# Patient Record
Sex: MALE | Race: WHITE | NOT HISPANIC OR LATINO | Employment: OTHER | RURAL
[De-identification: names, ages, dates, MRNs, and addresses within clinical notes are randomized per-mention and may not be internally consistent; named-entity substitution may affect disease eponyms.]

---

## 2023-06-23 DIAGNOSIS — M25.512 LEFT SHOULDER PAIN: ICD-10-CM

## 2023-06-23 DIAGNOSIS — M25.511 RIGHT SHOULDER PAIN: Primary | ICD-10-CM

## 2023-06-28 ENCOUNTER — CLINICAL SUPPORT (OUTPATIENT)
Dept: REHABILITATION | Facility: HOSPITAL | Age: 73
End: 2023-06-28
Payer: MEDICARE

## 2023-06-28 DIAGNOSIS — M25.512 CHRONIC LEFT SHOULDER PAIN: ICD-10-CM

## 2023-06-28 DIAGNOSIS — M25.512 LEFT SHOULDER PAIN: ICD-10-CM

## 2023-06-28 DIAGNOSIS — M25.511 RIGHT SHOULDER PAIN: Primary | ICD-10-CM

## 2023-06-28 DIAGNOSIS — G89.29 CHRONIC LEFT SHOULDER PAIN: ICD-10-CM

## 2023-06-28 DIAGNOSIS — M25.511 ACUTE PAIN OF RIGHT SHOULDER: ICD-10-CM

## 2023-06-28 PROCEDURE — 97162 PT EVAL MOD COMPLEX 30 MIN: CPT

## 2023-06-28 NOTE — PLAN OF CARE
[facility]   Physical Therapy Initial Evaluation      Name: Lm Gautam  Clinic Number: [id number]    Therapy Diagnosis:   Encounter Diagnoses   Name Primary?    Right shoulder pain Yes    Left shoulder pain         Physician: Kirill Zuniga MD    Physician Orders: PT Eval and Treat PROM AND AAROM  Medical Diagnosis from Referral: M25.511 (ICD-10-CM) - Right shoulder pain  M25.512 (ICD-10-CM) - Left shoulder pain    Evaluation Date: 6/28/2023  Authorization Period Expiration: 6/22/24  Plan of Care Expiration: 8/4/23  Progress Note Due: 10TH visit  Visit # / Visits authorized: 1/ 15   FOTO: 44/100    Precautions: Standard     Time In: 855  Time Out: 925  Total Appointment Time (timed & untimed codes): 30 minutes    Subjective     Date of onset: 6/12/23    History of current condition - Frederic reports:  he is s/p right shoulder orthopedic rotator cuff surgery on 6/12/23 and referred to therapy for post op rehab and rehab of left shoulder as well.    Falls: none    Imaging: none:     Prior Therapy: no  Social History:  lives with their spouse  Occupation: retired  Prior Level of Function: independent  Current Level of Function: independent    Pain:  Current 3/10, worst 4/10, best 0/10   Location: right shoulder    Description: Aching and Dull  Aggravating Factors: Laying  Easing Factors: pain medication    Patients goals: to rehabilitate my shoulder and return to activities on my farm     Medical History:   No past medical history on file. HTN, heart stent.    Surgical History:   Lm Gautam  has no past surgical history on file. Orthoscopic surgery of knee    Medications:   Lm currently has no medications in their medication list.    Allergies:   Review of patient's allergies indicates:  Not on File     Objective      Posture: rounded shoulders Yes, forward head Yes, increased kyphotic curve No, decreased lordotic curve No      Range of motion  Motion Right  Left    Shoulder  flexion 85 WITHIN FUNCTIONAL LIMITS   Shoulder extension 45 WITHIN FUNCTIONAL LIMITS   Shoulder abduction 64 WITHIN FUNCTIONAL LIMITS   Shoulder internal rotation 35 WITHIN FUNCTIONAL LIMITS   Shoulder external rotation 25 WITHIN FUNCTIONAL LIMITS   Elbow flexion WITHIN FUNCTIONAL LIMITS WITHIN FUNCTIONAL LIMITS   Elbow extension WITHIN FUNCTIONAL LIMITS WITHIN FUNCTIONAL LIMITS   Wrist flexion WITHIN FUNCTIONAL LIMITS WITHIN FUNCTIONAL LIMITS   Wrist extension WITHIN FUNCTIONAL LIMITS WITHIN FUNCTIONAL LIMITS   Ulnar deviation WITHIN FUNCTIONAL LIMITS WITHIN FUNCTIONAL LIMITS   Radial deviation WITHIN FUNCTIONAL LIMITS WITHIN FUNCTIONAL LIMITS       MANUAL MUSCLE TEST  Muscle Right  Left    Shoulder flexion MMT strength: 2+/5 MMT strength: 5/5   Shoulder extension MMT strength: 3-/5 MMT strength: 5/5   Shoulder abduction MMT strength: 2+/5 MMT strength: 5/5   Shoulder internal rotation MMT strength: 3-/5 MMT strength: 5/5   Shoulder external rotation MMT strength: 2+/5 MMT strength: 5/5   Elbow flexion MMT strength: 3-/5 MMT strength: 5/5   Elbow extension MMT strength: 3-/5 MMT strength: 5/5   Wrist flexion MMT strength: 5/5 MMT strength: 5/5   Wrist extension MMT strength: 5/5 MMT strength: 5/5   Ulnar deviation MMT strength: 5/5 MMT strength: 5/5   Radial deviation MMT strength: 5/5 MMT strength: 5/5     Functional ability:  Dressing: AMB PT LEVEL OF ASSISTANCE: independent  Driving: AMB PT LEVEL OF ASSISTANCE: independent  Overhead activity: AMB PT LEVEL OF ASSISTANCE: independent  Work/hobbies: AMB PT LEVEL OF ASSISTANCE: independent      Clinical test: left shoulder  Apprehension test: negative  Neer's test: negative  Scour test: negative  O'wilver's test: negative  Drop arm test: negative  Empty can test: negative  Hawkin's darian test: negative    Limitation/Restriction for FOTO Shoulder Survey    Therapist reviewed FOTO scores for Lm MINOR Gautam on 6/28/2023.   FOTO documents entered into [employer] - see  Media section.    Limitation Score: 44               Patient Education and Home Exercises     Education provided:   - plan of care discussed with patient.      Assessment     Lm is a 72 y.o. male referred to outpatient Physical Therapy with a medical diagnosis of M25.511 (ICD-10-CM) - Right shoulder pain  M25.512 (ICD-10-CM) - Left shoulder pain. Patient presents with pain of right shoulder, decreased ROM and decreased muscle strength.    Patient prognosis is Good.   Patient will benefit from skilled outpatient Physical Therapy to address the deficits stated above and in the chart below, provide patient /family education, and to maximize patientt's level of independence.     Plan of care discussed with patient: Yes  Patient's spiritual, cultural and educational needs considered and patient is agreeable to the plan of care and goals as stated below:     Anticipated Barriers for therapy: none    Medical Necessity is demonstrated by the following  History  Co-morbidities and personal factors that may impact the plan of care [] LOW: no personal factors / co-morbidities  [x] MODERATE: 1-2 personal factors / co-morbidities  [] HIGH: 3+ personal factors / co-morbidities    Moderate / High Support Documentation:   Co-morbidities affecting plan of care: HTN, heart stent.    Personal Factors:   no deficits     Examination  Body Structures and Functions, activity limitations and participation restrictions that may impact the plan of care [] LOW: addressing 1-2 elements  [x] MODERATE: 3+ elements  [] HIGH: 4+ elements (please support below)    Moderate / High Support Documentation: bilateral shoulder ROM, strength, symmetry, posture     Clinical Presentation [x] LOW: stable  [] MODERATE: Evolving  [] HIGH: Unstable     Decision Making/ Complexity Score: moderate       Goals: patient in agreement with the following goals:  Short Term Goals: 3 weeks   1. Patient will be independent with home exercise program in 1 week.  2.  Patient will report decreased right shoulder pain to 3/10 to improve quality of life.  3. Patient will increase right shoulder AROM grossly by 30 degrees to improve functional mobility.    Long Term Goals: 5 weeks   1. Patient will increase right shoulder strength to 3-/5 to improve functional mobility.  2. Patient will increase right shoulder flexion and abduction to 110 degrees.  3. Patient will increase right shoulder ER to 45 degrees and IR to 60 degrees to improve functional mobility  4. Patient will increase FOTO Discharge score to 71 to improve functional outcomes.    Plan     Plan of care Certification: 6/28/2023 to 8/4/23.    Outpatient Physical Therapy 3 times weekly for 5 weeks to include the following interventions: Electrical Stimulation IFC, Manual Therapy, Moist Heat/ Ice, Patient Education, Therapeutic Exercise, and Ultrasound.     Rickey Vicente, PT       Physician Signature:________________________________________________      Date:____________________________________________________________

## 2023-06-30 ENCOUNTER — CLINICAL SUPPORT (OUTPATIENT)
Dept: REHABILITATION | Facility: HOSPITAL | Age: 73
End: 2023-06-30
Payer: MEDICARE

## 2023-06-30 DIAGNOSIS — M25.512 CHRONIC LEFT SHOULDER PAIN: ICD-10-CM

## 2023-06-30 DIAGNOSIS — G89.29 CHRONIC LEFT SHOULDER PAIN: ICD-10-CM

## 2023-06-30 DIAGNOSIS — M25.511 ACUTE PAIN OF RIGHT SHOULDER: Primary | ICD-10-CM

## 2023-06-30 PROCEDURE — 97110 THERAPEUTIC EXERCISES: CPT

## 2023-06-30 PROCEDURE — 97140 MANUAL THERAPY 1/> REGIONS: CPT

## 2023-06-30 NOTE — PROGRESS NOTES
[facility]   Physical Therapy Treatment Note      Name: Lm Gautam  Clinic Number: [id number]    Therapy Diagnosis:  s/p right shoulder Rotator cuff repair  Encounter Diagnoses   Name Primary?    Acute pain of right shoulder Yes    Chronic left shoulder pain      Physician: Kirill Zuniga MD    Visit Date: 6/30/2023    [copy and paste header from rito here]    PTA Visit #: 0/5     Time In: 800  Time Out: 845  Total Billable Time: 45 minutes    Subjective     Pt reports: no complaints.    Response to previous treatment: today is his first treatment.  Functional change: none    Pain: 0/10  Location: right shoulder      Objective      Objective Measures updated at progress report unless specified.     Treatment     Frederic received the treatments listed below:    Focus treatment on right shoulder PROM/AAROM NO ACTIVE BICEP FOR 3 WEEKS 6/30/23 TO 7/23/24  therapeutic exercises to develop strength and ROM for 35 minutes including:  Right shoulder:  Codmans/pendulums: 3 minutes  Pulleys with elbow bent 2 minutes  Seated abduction elbow bent with dowel 2 x 20  Supine ER with dowel 2 x 20    Left shoulder Home Exercise Program  Flexion, abduction, ER, IR 1 x 10 with green Theraband      manual therapy techniques: Joint mobilizations and Soft tissue Mobilization were applied to the: right shoulder for 10 minutes, including: Mild joint mobilization, passive manual stretching flexion and ER and scapular mobilization.      Patient Education and Home Exercises       Education provided:   -    received verbal and tactile cues to facilitate proper execution of exercises and body mechanics.     Written Home Exercises Provided: yes. Exercises were reviewed and Frederic was able to demonstrate them prior to the end of the session.  Frederic demonstrated good  understanding of the education provided. Frederic received printed handout of home exercises.    Assessment     Frederic tolerated all therapy well today and he  purchased shoulder pulleys for home use.    Frederic Is progressing well towards his goals.   Pt prognosis is Good.     Pt will continue to benefit from skilled outpatient physical therapy to address the deficits listed in the problem list box on initial evaluation, provide pt/family education and to maximize pt's level of independence in the home and community environment.     Pt's spiritual, cultural and educational needs considered and pt agreeable to plan of care and goals.     Anticipated barriers to physical therapy: none    Goals: Goals: patient in agreement with the following goals:  Short Term Goals: 3 weeks   1. Patient will be independent with home exercise program in 1 week.  2. Patient will report decreased right shoulder pain to 3/10 to improve quality of life.  3. Patient will increase right shoulder AROM grossly by 30 degrees to improve functional mobility.    Long Term Goals: 5 weeks   1. Patient will increase right shoulder strength to 3-/5 to improve functional mobility.  2. Patient will increase right shoulder flexion and abduction to 110 degrees.  3. Patient will increase right shoulder ER to 45 degrees and IR to 60 degrees to improve functional mobility  4. Patient will increase FOTO Discharge score to 71 to improve functional outcomes.    Plan     Continue with present plan of care.    Rickey Vicente, PT

## 2023-07-03 ENCOUNTER — CLINICAL SUPPORT (OUTPATIENT)
Dept: REHABILITATION | Facility: HOSPITAL | Age: 73
End: 2023-07-03
Payer: MEDICARE

## 2023-07-03 DIAGNOSIS — M25.512 CHRONIC LEFT SHOULDER PAIN: ICD-10-CM

## 2023-07-03 DIAGNOSIS — G89.29 CHRONIC LEFT SHOULDER PAIN: ICD-10-CM

## 2023-07-03 DIAGNOSIS — M25.511 ACUTE PAIN OF RIGHT SHOULDER: Primary | ICD-10-CM

## 2023-07-03 PROCEDURE — 97140 MANUAL THERAPY 1/> REGIONS: CPT | Mod: CQ

## 2023-07-03 PROCEDURE — 97110 THERAPEUTIC EXERCISES: CPT | Mod: CQ

## 2023-07-03 NOTE — PROGRESS NOTES
"[facility]   Physical Therapy Treatment Note      Name: Lm Gautam  Clinic Number: [id number]    Therapy Diagnosis: s/p right shoulder Rotator cuff repair  Encounter Diagnoses   Name Primary?    Chronic left shoulder pain     Acute pain of right shoulder Yes     Physician: Kirill Zuniga MD    Visit Date: 7/3/2023    PTA Visit #: 1/5     Time In: 845  Time Out: 918  Total Billable Time: 33 minutes    Subjective     Pt reports: "I have sharp pains every now and then but that's it."    Response to previous treatment: Good.  Functional change: none    Pain: 0/10  Location: right shoulder      Objective      Objective Measures updated at progress report unless specified.     Treatment     Frederic received the treatments listed below:    Focus treatment on right shoulder PROM/AAROM NO ACTIVE BICEP FOR 3 WEEKS 6/30/23 TO 7/23/24  Therapeutic exercises to develop strength and ROM for 23 minutes including:  Right shoulder:  Codmans/pendulums: 3 minutes  Pulleys with elbow bent 2 minutes  Seated abduction elbow bent with dowel 2 x 20  Supine ER with dowel 2 x 20    NOT TODAY: Flexion, abduction, ER, IR 1 x 10 with green Theraband      Manual therapy techniques: Joint mobilizations and Soft tissue Mobilization were applied to the: right shoulder for 10 minutes, including: Mild joint mobilization, passive manual stretching flexion and ER and scapular mobilization.      Patient Education and Home Exercises       Education provided:   -    received verbal and tactile cues to facilitate proper execution of exercises and body mechanics.     Written Home Exercises Provided: yes. Exercises were reviewed and Frederic was able to demonstrate them prior to the end of the session.  Frederic demonstrated good  understanding of the education provided. Frederic received printed handout of home exercises.    Assessment     Frederic tolerated all exercises fairly well today without extreme difficulty or increased pain " noted.    Frederic Is progressing well towards his goals.   Pt prognosis is Good.     Pt will continue to benefit from skilled outpatient physical therapy to address the deficits listed in the problem list box on initial evaluation, provide pt/family education and to maximize pt's level of independence in the home and community environment.     Pt's spiritual, cultural and educational needs considered and pt agreeable to plan of care and goals.     Anticipated barriers to physical therapy: none    Goals: Goals: patient in agreement with the following goals:  Short Term Goals: 3 weeks   1. Patient will be independent with home exercise program in 1 week.  2. Patient will report decreased right shoulder pain to 3/10 to improve quality of life.  3. Patient will increase right shoulder AROM grossly by 30 degrees to improve functional mobility.    Long Term Goals: 5 weeks   1. Patient will increase right shoulder strength to 3-/5 to improve functional mobility.  2. Patient will increase right shoulder flexion and abduction to 110 degrees.  3. Patient will increase right shoulder ER to 45 degrees and IR to 60 degrees to improve functional mobility  4. Patient will increase FOTO Discharge score to 71 to improve functional outcomes.    Plan     Continue with present plan of care.    Diana Kebede, PTA

## 2023-07-05 ENCOUNTER — CLINICAL SUPPORT (OUTPATIENT)
Dept: REHABILITATION | Facility: HOSPITAL | Age: 73
End: 2023-07-05
Payer: MEDICARE

## 2023-07-05 DIAGNOSIS — M25.511 ACUTE PAIN OF RIGHT SHOULDER: Primary | ICD-10-CM

## 2023-07-05 PROCEDURE — 97110 THERAPEUTIC EXERCISES: CPT

## 2023-07-05 PROCEDURE — 97140 MANUAL THERAPY 1/> REGIONS: CPT

## 2023-07-05 NOTE — PROGRESS NOTES
"[facility]   Physical Therapy Treatment Note      Name: Lm Gautam  Clinic Number: [id number]    Therapy Diagnosis: s/p right shoulder Rotator cuff repair  Encounter Diagnosis   Name Primary?    Acute pain of right shoulder Yes     Physician: Kirill Zuniga MD    Visit Date: 7/5/2023    PTA Visit #: 1/5     Time In: 758  Time Out: 832  Total Billable Time: 3 minutes    Subjective     Pt reports: "No pain this morning."    Response to previous treatment: Good.  Functional change: none    Pain: 0/10  Location: right shoulder      Objective      Objective Measures updated at progress report unless specified.     Treatment     Frederic received the treatments listed below:    Focus treatment on right shoulder PROM/AAROM NO ACTIVE BICEP FOR 3 WEEKS 6/30/23 TO 7/23/24  Therapeutic exercises to develop strength and ROM for 21 minutes including:  Right shoulder:  Codmans/pendulums: 3 minutes  Pulleys with elbow bent 2 minutes  Seated abduction elbow bent with dowel 2 x 20  Seated scapular squeezes 2 x 20  Supine ER with dowel 2 x 20  Finger ladder flexion 1 x 20    Manual therapy techniques: Joint mobilizations and Soft tissue Mobilization were applied to the: right shoulder for 12 minutes, including: Mild joint mobilization, passive manual stretching flexion and ER and scapular mobilization.      Patient Education and Home Exercises       Education provided:   -    received verbal and tactile cues to facilitate proper execution of exercises and body mechanics.     Written Home Exercises Provided: yes. Completed    Assessment     Frederic tolerated all exercises well with only mild discomfort with terminal stretches of right shoulder.    Frederic Is progressing well towards his goals.   Pt prognosis is Good.     Pt will continue to benefit from skilled outpatient physical therapy to address the deficits listed in the problem list box on initial evaluation, provide pt/family education and to maximize " pt's level of independence in the home and community environment.     Pt's spiritual, cultural and educational needs considered and pt agreeable to plan of care and goals.     Anticipated barriers to physical therapy: none    Goals: Goals: patient in agreement with the following goals:  Short Term Goals: 3 weeks   1. Patient will be independent with home exercise program in 1 week.  2. Patient will report decreased right shoulder pain to 3/10 to improve quality of life.  3. Patient will increase right shoulder AROM grossly by 30 degrees to improve functional mobility.    Long Term Goals: 5 weeks   1. Patient will increase right shoulder strength to 3-/5 to improve functional mobility.  2. Patient will increase right shoulder flexion and abduction to 110 degrees.  3. Patient will increase right shoulder ER to 45 degrees and IR to 60 degrees to improve functional mobility  4. Patient will increase FOTO Discharge score to 71 to improve functional outcomes.    Plan     Continue with present plan of care.    Rickey Vicente, PT

## 2023-07-07 ENCOUNTER — CLINICAL SUPPORT (OUTPATIENT)
Dept: REHABILITATION | Facility: HOSPITAL | Age: 73
End: 2023-07-07
Payer: MEDICARE

## 2023-07-07 DIAGNOSIS — M25.511 ACUTE PAIN OF RIGHT SHOULDER: Primary | ICD-10-CM

## 2023-07-07 PROCEDURE — 97110 THERAPEUTIC EXERCISES: CPT

## 2023-07-07 PROCEDURE — 97140 MANUAL THERAPY 1/> REGIONS: CPT

## 2023-07-07 NOTE — PROGRESS NOTES
"[facility]   Physical Therapy Treatment Note      Name: Lm Gautam  Clinic Number: [id number]    Therapy Diagnosis: s/p right shoulder Rotator cuff repair  Encounter Diagnosis   Name Primary?    Acute pain of right shoulder Yes     Physician: Kirill Zuniga MD    Visit Date: 7/7/2023    PTA Visit #: 1/5     Time In: 801  Time Out: 840  Total Billable Time: 39 minutes    Subjective     Pt reports: "I'm not have any problems right now but when I first wake up in the morning my shoulder is real tight."    Response to previous treatment: Good.  Functional change: none    Pain: 4/10  Location: right shoulder      Objective      Objective Measures updated at progress report unless specified.     Range of motion Right Shoulder  Motion AROM PROM   Shoulder flexion 135 145   Shoulder extension 55 68   Shoulder abduction 95 104   Shoulder internal rotation 50 65   Shoulder external rotation 40 45       Treatment     Frederic received the treatments listed below:    Focus treatment on right shoulder PROM/AAROM NO ACTIVE BICEP FOR 3 WEEKS 6/30/23 TO 7/23/24  Therapeutic exercises to develop strength and ROM for 29 minutes including:  Right shoulder:  Codmans/pendulums: 3 minutes  Pulleys with elbow bent 4 minutes  Seated abduction elbow bent with dowel 2 x 20  Seated scapular squeezes 2 x 20  Supine ER with dowel 2 x 20  Supine overhead flexion elbow bent with dowel 2 x 20  Finger ladder flexion and abduction 1 x 10 each    Manual therapy techniques: Joint mobilizations and Soft tissue Mobilization were applied to the: right shoulder for 10 minutes, including: Mild joint mobilization, passive manual stretching flexion and ER.      Patient Education and Home Exercises       Education provided:   -    received verbal and tactile cues to facilitate proper execution of exercises and body mechanics.     Written Home Exercises Provided: yes. Completed    Assessment     Frederic is responding well to therapy " and is demonstrating improved right shoulder ROM per objective measurements.    Frederic Is progressing well towards his goals.   Pt prognosis is Good.     Pt will continue to benefit from skilled outpatient physical therapy to address the deficits listed in the problem list box on initial evaluation, provide pt/family education and to maximize pt's level of independence in the home and community environment.     Pt's spiritual, cultural and educational needs considered and pt agreeable to plan of care and goals.     Anticipated barriers to physical therapy: none    Goals: Goals: patient in agreement with the following goals:  Short Term Goals: 3 weeks   1. Patient will be independent with home exercise program in 1 week. GOAL MET.  2. Patient will report decreased right shoulder pain to 3/10 to improve quality of life.  3. Patient will increase right shoulder AROM grossly by 30 degrees to improve functional mobility. GOAL MET.    Long Term Goals: 5 weeks   1. Patient will increase right shoulder strength to 3-/5 to improve functional mobility.  2. Patient will increase right shoulder flexion and abduction to 110 degrees.  3. Patient will increase right shoulder ER to 45 degrees and IR to 60 degrees to improve functional mobility  4. Patient will increase FOTO Discharge score to 71 to improve functional outcomes.    Plan     Continue with present plan of care.    Rickey Vicente, PT

## 2023-07-11 ENCOUNTER — CLINICAL SUPPORT (OUTPATIENT)
Dept: REHABILITATION | Facility: HOSPITAL | Age: 73
End: 2023-07-11
Payer: MEDICARE

## 2023-07-11 DIAGNOSIS — M25.511 ACUTE PAIN OF RIGHT SHOULDER: Primary | ICD-10-CM

## 2023-07-11 PROCEDURE — 97110 THERAPEUTIC EXERCISES: CPT

## 2023-07-11 NOTE — PROGRESS NOTES
"[facility]   Physical Therapy Treatment Note      Name: Lm Gautam  Clinic Number: [id number]    Therapy Diagnosis: s/p right shoulder Rotator cuff repair  Encounter Diagnosis   Name Primary?    Acute pain of right shoulder Yes     Physician: Kirill Zuniga MD    Visit Date: 7/11/2023  Visit#: 6/15  PTA Visit #: 1/5     Time In: 800  Time Out: 830  Total Billable Time: 30 minutes    Subjective     Pt reports: "My shoulder hurt pretty good after our last treatment but it is better now."    Response to previous treatment: Fair.  Functional change: none    Pain: 3/10  Location: right shoulder      Objective      Objective Measures updated at progress report unless specified.     Range of motion Right Shoulder  Motion AROM PROM   Shoulder flexion 135 145   Shoulder extension 55 68   Shoulder abduction 95 104   Shoulder internal rotation 50 65   Shoulder external rotation 40 45       Treatment     Frederic received the treatments listed below:    Focus treatment on right shoulder PROM/AAROM NO ACTIVE BICEP FOR 3 WEEKS 6/30/23 TO 7/23/24  Therapeutic exercises to develop strength and ROM for 29 minutes including:  Right shoulder:  Codmans/pendulums: 3 minutes  Pulleys with elbow bent 4 minutes  Seated abduction elbow bent with dowel 2 x 20  Seated scapular squeezes 2 x 20  Seated ER/IR 2 x 20  Supine ER with dowel 2 x 20  Supine overhead flexion elbow bent with dowel 2 x 20  Finger ladder flexion and abduction 1 x 10 each    NOT TODAY-Manual therapy techniques: Joint mobilizations and Soft tissue Mobilization were applied to the: right shoulder for () minutes, including: Mild joint mobilization, passive manual stretching flexion and ER.      Patient Education and Home Exercises       Education provided:   -    received verbal and tactile cues to facilitate proper execution of exercises and body mechanics.     Written Home Exercises Provided: yes. Completed    Assessment     Frederic tolerated " treatment well today with only mild discomfort with ROM exercises.    Frederic Is progressing well towards his goals.   Pt prognosis is Good.     Pt will continue to benefit from skilled outpatient physical therapy to address the deficits listed in the problem list box on initial evaluation, provide pt/family education and to maximize pt's level of independence in the home and community environment.     Pt's spiritual, cultural and educational needs considered and pt agreeable to plan of care and goals.     Anticipated barriers to physical therapy: none    Goals: Goals: patient in agreement with the following goals:  Short Term Goals: 3 weeks   1. Patient will be independent with home exercise program in 1 week. GOAL MET.  2. Patient will report decreased right shoulder pain to 3/10 to improve quality of life.  3. Patient will increase right shoulder AROM grossly by 30 degrees to improve functional mobility. GOAL MET.    Long Term Goals: 5 weeks   1. Patient will increase right shoulder strength to 3-/5 to improve functional mobility.  2. Patient will increase right shoulder flexion and abduction to 110 degrees.  3. Patient will increase right shoulder ER to 45 degrees and IR to 60 degrees to improve functional mobility  4. Patient will increase FOTO Discharge score to 71 to improve functional outcomes.    Plan     Continue with present plan of care.    Rickey Vicente, PT

## 2023-07-12 ENCOUNTER — CLINICAL SUPPORT (OUTPATIENT)
Dept: REHABILITATION | Facility: HOSPITAL | Age: 73
End: 2023-07-12
Payer: MEDICARE

## 2023-07-12 DIAGNOSIS — M25.511 ACUTE PAIN OF RIGHT SHOULDER: Primary | ICD-10-CM

## 2023-07-12 PROCEDURE — 97110 THERAPEUTIC EXERCISES: CPT

## 2023-07-12 NOTE — PROGRESS NOTES
"[facility]   Physical Therapy Treatment Note      Name: Lm Gautam  Clinic Number: [id number]    Therapy Diagnosis: s/p right shoulder Rotator cuff repair  Encounter Diagnosis   Name Primary?    Acute pain of right shoulder Yes     Physician: Kiirll Zuniga MD    Visit Date: 7/12/2023  Visit#: 7/15  PTA Visit #: 1/5     Time In: 759  Time Out: 830  Total Billable Time: 31 minutes    Subjective     Pt reports: "My shoulder feels good today."    Response to previous treatment: Fair.  Functional change: none    Pain: 0/10  Location: right shoulder      Objective      Objective Measures updated at progress report unless specified.     Range of motion Right Shoulder  Motion AROM PROM   Shoulder flexion 135 145   Shoulder extension 55 68   Shoulder abduction 95 104   Shoulder internal rotation 50 65   Shoulder external rotation 40 45       Treatment     Frederic received the treatments listed below:    Focus treatment on right shoulder PROM/AAROM NO ACTIVE BICEP FOR 3 WEEKS 6/30/23 TO 7/23/24  Therapeutic exercises to develop strength and ROM for 31 minutes including:  Right shoulder:  Codmans/pendulums: 3 minutes  Pulleys with elbow bent 5 minutes  Finger ladder flexion and abduction 2 x 10 each  Seated abduction elbow bent with dowel 2 x 20  Seated scapular squeezes 2 x 20  Seated ER/IR 2 x 20  Supine ER with dowel 2 x 20  Supine overhead flexion elbow bent with dowel 2 x 20      NOT TODAY-Manual therapy techniques: Joint mobilizations and Soft tissue Mobilization were applied to the: right shoulder for () minutes, including: Mild joint mobilization, passive manual stretching flexion and ER.      Patient Education and Home Exercises       Education provided:   -    received verbal and tactile cues to facilitate proper execution of exercises and body mechanics.     Written Home Exercises Provided: yes. Completed    Assessment     Frederic is progressing well and has met his STG's.    Frederic Is " progressing well towards his goals.   Pt prognosis is Good.     Pt will continue to benefit from skilled outpatient physical therapy to address the deficits listed in the problem list box on initial evaluation, provide pt/family education and to maximize pt's level of independence in the home and community environment.     Pt's spiritual, cultural and educational needs considered and pt agreeable to plan of care and goals.     Anticipated barriers to physical therapy: none    Goals: Goals: patient in agreement with the following goals:  Short Term Goals: 3 weeks   1. Patient will be independent with home exercise program in 1 week. GOAL MET.  2. Patient will report decreased right shoulder pain to 3/10 to improve quality of life. GOAL MET.  3. Patient will increase right shoulder AROM grossly by 30 degrees to improve functional mobility. GOAL MET.    Long Term Goals: 5 weeks   1. Patient will increase right shoulder strength to 3-/5 to improve functional mobility.  2. Patient will increase right shoulder flexion and abduction to 110 degrees.  3. Patient will increase right shoulder ER to 45 degrees and IR to 60 degrees to improve functional mobility  4. Patient will increase FOTO Discharge score to 71 to improve functional outcomes.    Plan     Continue with present plan of care.    Rickey Vicente, PT

## 2023-07-14 ENCOUNTER — CLINICAL SUPPORT (OUTPATIENT)
Dept: REHABILITATION | Facility: HOSPITAL | Age: 73
End: 2023-07-14
Payer: MEDICARE

## 2023-07-14 DIAGNOSIS — M25.511 ACUTE PAIN OF RIGHT SHOULDER: Primary | ICD-10-CM

## 2023-07-14 PROCEDURE — 97110 THERAPEUTIC EXERCISES: CPT

## 2023-07-14 NOTE — PROGRESS NOTES
"[facility]   Physical Therapy Treatment Note      Name: Lm Gautam  Clinic Number: [id number]    Therapy Diagnosis: s/p right shoulder Rotator cuff repair  Encounter Diagnosis   Name Primary?    Acute pain of right shoulder Yes     Physician: Kirill Zuniga MD    Visit Date: 7/14/2023  Visit#: 7/15  PTA Visit #: 1/5     Time In: 800  Time Out: 830  Total Billable Time: 30 minutes    Subjective     Pt reports: "My shoulder just feels tight but doesn't hurt"    Response to previous treatment: Fair.  Functional change: none    Pain: 0/10  Location: right shoulder      Objective      Objective Measures updated at progress report unless specified.     7/14/23  Range of motion Right Shoulder  Motion AROM PROM   Shoulder flexion 135 145   Shoulder extension 50 68   Shoulder abduction 100 104   Shoulder internal rotation 52 65   Shoulder external rotation 52 45       Treatment     Frederic received the treatments listed below:    Focus treatment on right shoulder PROM/AAROM NO ACTIVE BICEP FOR 3 WEEKS 6/30/23 TO 7/23/24  Therapeutic exercises to develop strength and ROM for 30 minutes including:  Right shoulder:  Codmans/pendulums: 3 minutes  Pulleys with elbow bent 5 minutes  Finger ladder flexion and abduction 2 x 10 each  Seated abduction elbow bent with dowel  x 20  Seated scapular squeezes  x 20  Seated ER/IR  x 20  Supine ER with dowel  x 20  Supine overhead flexion elbow bent with dowel x 20      NOT TODAY-Manual therapy techniques: Joint mobilizations and Soft tissue Mobilization were applied to the: right shoulder for () minutes, including: Mild joint mobilization, passive manual stretching flexion and ER.      Patient Education and Home Exercises       Education provided:   -    received verbal and tactile cues to facilitate proper execution of exercises and body mechanics.     Written Home Exercises Provided: yes. Completed    Assessment     Frederic is responding well to therapy and " progressing with ROM.    Frederic Is progressing well towards his goals.   Pt prognosis is Good.     Pt will continue to benefit from skilled outpatient physical therapy to address the deficits listed in the problem list box on initial evaluation, provide pt/family education and to maximize pt's level of independence in the home and community environment.     Pt's spiritual, cultural and educational needs considered and pt agreeable to plan of care and goals.     Anticipated barriers to physical therapy: none    Goals: Goals: patient in agreement with the following goals:  Short Term Goals: 3 weeks   1. Patient will be independent with home exercise program in 1 week. GOAL MET.  2. Patient will report decreased right shoulder pain to 3/10 to improve quality of life. GOAL MET.  3. Patient will increase right shoulder AROM grossly by 30 degrees to improve functional mobility. GOAL MET.    Long Term Goals: 5 weeks   1. Patient will increase right shoulder strength to 3-/5 to improve functional mobility.  2. Patient will increase right shoulder flexion and abduction to 110 degrees.  3. Patient will increase right shoulder ER to 45 degrees and IR to 60 degrees to improve functional mobility  4. Patient will increase FOTO Discharge score to 71 to improve functional outcomes.    Plan     Continue with present plan of care.    Rickey Vicente, PT

## 2023-07-18 ENCOUNTER — CLINICAL SUPPORT (OUTPATIENT)
Dept: REHABILITATION | Facility: HOSPITAL | Age: 73
End: 2023-07-18
Payer: MEDICARE

## 2023-07-18 DIAGNOSIS — M25.511 ACUTE PAIN OF RIGHT SHOULDER: Primary | ICD-10-CM

## 2023-07-18 PROCEDURE — 97110 THERAPEUTIC EXERCISES: CPT

## 2023-07-18 NOTE — PROGRESS NOTES
"[facility]   Physical Therapy Treatment Note      Name: Lm Gautam  Clinic Number: [id number]    Therapy Diagnosis: s/p right shoulder Rotator cuff repair  No diagnosis found.    Physician: Kirill Zuniga MD    Visit Date: 7/18/2023  Visit#: 7/15  PTA Visit #: 0/5     Time In: 803  Time Out: 834  Total Billable Time: 31 minutes    Subjective     Pt reports: "It feels alright; just stiff but no pain"    Response to previous treatment: Good  Functional change: none    Pain: 0/10  Location: right shoulder      Objective      Objective Measures updated at progress report unless specified.     7/14/23  Range of motion Right Shoulder  Motion AROM PROM   Shoulder flexion 135 145   Shoulder extension 50 68   Shoulder abduction 100 104   Shoulder internal rotation 52 65   Shoulder external rotation 52 45       Treatment     Frederic received the treatments listed below:    Focus treatment on right shoulder PROM/AAROM NO ACTIVE BICEP FOR 3 WEEKS 6/30/23 TO 7/23/24  Therapeutic exercises to develop strength and ROM for 30 minutes including:  Right shoulder:  Codmans/pendulums: 3 minutes  Pulleys with elbow bent 5 minutes  Finger ladder flexion and abduction 2 x 10 each  Seated abduction elbow bent with dowel  x 20  Seated scapular squeezes  x 20  Seated ER/IR  x 20  Supine ER with dowel x 20  Supine overhead flexion elbow bent with dowel x 20      NOT TODAY-Manual therapy techniques: Joint mobilizations and Soft tissue Mobilization were applied to the: right shoulder for () minutes, including: Mild joint mobilization, passive manual stretching flexion and ER.      Patient Education and Home Exercises       Education provided:   -    received verbal and tactile cues to facilitate proper execution of exercises and body mechanics.     Written Home Exercises Provided: yes. Completed    Assessment     Frederic is responding well to therapy and progressing with ROM.    Frederic Is progressing well towards his " goals.   Pt prognosis is Good.     Pt will continue to benefit from skilled outpatient physical therapy to address the deficits listed in the problem list box on initial evaluation, provide pt/family education and to maximize pt's level of independence in the home and community environment.     Pt's spiritual, cultural and educational needs considered and pt agreeable to plan of care and goals.     Anticipated barriers to physical therapy: none    Goals: Goals: patient in agreement with the following goals:  Short Term Goals: 3 weeks   1. Patient will be independent with home exercise program in 1 week. GOAL MET.  2. Patient will report decreased right shoulder pain to 3/10 to improve quality of life. GOAL MET.  3. Patient will increase right shoulder AROM grossly by 30 degrees to improve functional mobility. GOAL MET.    Long Term Goals: 5 weeks   1. Patient will increase right shoulder strength to 3-/5 to improve functional mobility.  2. Patient will increase right shoulder flexion and abduction to 110 degrees.  3. Patient will increase right shoulder ER to 45 degrees and IR to 60 degrees to improve functional mobility  4. Patient will increase FOTO Discharge score to 71 to improve functional outcomes.    Plan     Continue with present plan of care.    Alyce Echevarria, PT

## 2023-07-19 ENCOUNTER — CLINICAL SUPPORT (OUTPATIENT)
Dept: REHABILITATION | Facility: HOSPITAL | Age: 73
End: 2023-07-19
Payer: MEDICARE

## 2023-07-19 DIAGNOSIS — M25.511 ACUTE PAIN OF RIGHT SHOULDER: Primary | ICD-10-CM

## 2023-07-19 PROCEDURE — 97110 THERAPEUTIC EXERCISES: CPT | Mod: CQ

## 2023-07-19 NOTE — PROGRESS NOTES
"[facility]   Physical Therapy Treatment Note      Name: Lm Gautam  Clinic Number: [id number]    Therapy Diagnosis: s/p right shoulder Rotator cuff repair  Encounter Diagnosis   Name Primary?    Acute pain of right shoulder Yes       Physician: Kirill Zuniga MD    Visit Date: 7/19/2023  Visit#: 10/15  PTA Visit #: 1/5     Time In: 803  Time Out: 830  Total Billable Time: 27 minutes    Subjective     Pt reports: "Still feels tight. Not really any pain with it."     Response to previous treatment: Good  Functional change: none    Pain: 0/10  Location: right shoulder      Objective      Objective Measures updated at progress report unless specified.     7/14/23  Range of motion Right Shoulder  Motion AROM PROM   Shoulder flexion 135 145   Shoulder extension 50 68   Shoulder abduction 100 104   Shoulder internal rotation 52 65   Shoulder external rotation 52 45       Treatment     Frederic received the treatments listed below:    Focus treatment on right shoulder PROM/AAROM NO ACTIVE BICEP FOR 3 WEEKS 6/30/23 TO 7/23/24  Therapeutic exercises to develop strength and ROM for  27 minutes including:  Right shoulder:  Codmans/pendulums: 3 minutes  Pulleys with elbow bent 5 minutes  Finger ladder flexion and abduction 2 x 10 each  Seated abduction elbow bent with dowel 2 x 20  Seated scapular squeezes 2 x 20  Seated ER/IR 2 x 20  Supine ER with dowel 2 x 10 5 s/h   Supine overhead flexion elbow bent with dowel 2 x 10 5 s/h       NOT TODAY-Manual therapy techniques: Joint mobilizations and Soft tissue Mobilization were applied to the: right shoulder for () minutes, including: Mild joint mobilization, passive manual stretching flexion and ER.      Patient Education and Home Exercises       Education provided:   -    received verbal and tactile cues to facilitate proper execution of exercises and body mechanics.     Written Home Exercises Provided: yes. Completed    Assessment     Frederic able to " perform all ROM exercises well without difficulty.     Frederic Is progressing well towards his goals.   Pt prognosis is Good.     Pt will continue to benefit from skilled outpatient physical therapy to address the deficits listed in the problem list box on initial evaluation, provide pt/family education and to maximize pt's level of independence in the home and community environment.     Pt's spiritual, cultural and educational needs considered and pt agreeable to plan of care and goals.     Anticipated barriers to physical therapy: none    Goals: Goals: patient in agreement with the following goals:  Short Term Goals: 3 weeks   1. Patient will be independent with home exercise program in 1 week. GOAL MET.  2. Patient will report decreased right shoulder pain to 3/10 to improve quality of life. GOAL MET.  3. Patient will increase right shoulder AROM grossly by 30 degrees to improve functional mobility. GOAL MET.    Long Term Goals: 5 weeks   1. Patient will increase right shoulder strength to 3-/5 to improve functional mobility.  2. Patient will increase right shoulder flexion and abduction to 110 degrees.  3. Patient will increase right shoulder ER to 45 degrees and IR to 60 degrees to improve functional mobility  4. Patient will increase FOTO Discharge score to 71 to improve functional outcomes.    Plan     Continue with present plan of care.    Diana Kebede, PTA

## 2023-07-21 ENCOUNTER — CLINICAL SUPPORT (OUTPATIENT)
Dept: REHABILITATION | Facility: HOSPITAL | Age: 73
End: 2023-07-21
Payer: MEDICARE

## 2023-07-21 DIAGNOSIS — M25.511 ACUTE PAIN OF RIGHT SHOULDER: Primary | ICD-10-CM

## 2023-07-21 PROCEDURE — 97110 THERAPEUTIC EXERCISES: CPT | Mod: CQ

## 2023-07-21 NOTE — PROGRESS NOTES
"[facility]   Physical Therapy Treatment Note      Name: Lm Gautam  Clinic Number: [id number]    Therapy Diagnosis: s/p right shoulder Rotator cuff repair  Encounter Diagnosis   Name Primary?    Acute pain of right shoulder Yes       Physician: Kirill Zuniga MD    Visit Date: 7/21/2023  Visit#: 11/15  PTA Visit #: 2/5     Time In: 803  Time Out: 830  Total Billable Time: 27 minutes    Subjective     Pt reports: "I had a stinger last night after I got up from my recliner. I didn't use my right arm but I did rock forward to get up."     Response to previous treatment: Good  Functional change: none    Pain: 0/10  Location: right shoulder      Objective      Objective Measures updated at progress report unless specified.     7/14/23  Range of motion Right Shoulder  Motion AROM PROM   Shoulder flexion 135 145   Shoulder extension 50 68   Shoulder abduction 100 104   Shoulder internal rotation 52 65   Shoulder external rotation 52 45       Treatment     Frederic received the treatments listed below:    Focus treatment on right shoulder PROM/AAROM NO ACTIVE BICEP FOR 3 WEEKS 6/30/23 TO 7/23/24  Therapeutic exercises to develop strength and ROM for 27 minutes including:  Right shoulder:  Codmans/pendulums: 3 minutes  Pulleys with elbow bent 5 minutes  Finger ladder flexion and abduction 2 x 10 each  Seated abduction elbow bent with dowel 2 x 10 5 s/h   Seated scapular squeezes 2 x 20  Seated ER/IR 2 x 20  Supine ER with dowel 2 x 10 5 s/h   Supine overhead flexion elbow bent with dowel 2 x 10 5 s/h       NOT TODAY-Manual therapy techniques: Joint mobilizations and Soft tissue Mobilization were applied to the: right shoulder for () minutes, including: Mild joint mobilization, passive manual stretching flexion and ER.      Patient Education and Home Exercises       Education provided:   -    received verbal and tactile cues to facilitate proper execution of exercises and body mechanics. "     Written Home Exercises Provided: yes. Completed    Assessment     Frederic performed all exercises well today without increased pain vc on proper technique for seated External rotation/Internal rotation to prevent pain.     Frederic Is progressing well towards his goals.   Pt prognosis is Good.     Pt will continue to benefit from skilled outpatient physical therapy to address the deficits listed in the problem list box on initial evaluation, provide pt/family education and to maximize pt's level of independence in the home and community environment.     Pt's spiritual, cultural and educational needs considered and pt agreeable to plan of care and goals.     Anticipated barriers to physical therapy: none    Goals: Goals: patient in agreement with the following goals:  Short Term Goals: 3 weeks   1. Patient will be independent with home exercise program in 1 week. GOAL MET.  2. Patient will report decreased right shoulder pain to 3/10 to improve quality of life. GOAL MET.  3. Patient will increase right shoulder AROM grossly by 30 degrees to improve functional mobility. GOAL MET.    Long Term Goals: 5 weeks   1. Patient will increase right shoulder strength to 3-/5 to improve functional mobility.  2. Patient will increase right shoulder flexion and abduction to 110 degrees.  3. Patient will increase right shoulder ER to 45 degrees and IR to 60 degrees to improve functional mobility  4. Patient will increase FOTO Discharge score to 71 to improve functional outcomes.    Plan     Continue with present plan of care.    Diana Kebede, PTA

## 2023-07-24 ENCOUNTER — CLINICAL SUPPORT (OUTPATIENT)
Dept: REHABILITATION | Facility: HOSPITAL | Age: 73
End: 2023-07-24
Payer: MEDICARE

## 2023-07-24 DIAGNOSIS — M25.511 ACUTE PAIN OF RIGHT SHOULDER: Primary | ICD-10-CM

## 2023-07-24 PROCEDURE — 97110 THERAPEUTIC EXERCISES: CPT

## 2023-07-24 PROCEDURE — 97140 MANUAL THERAPY 1/> REGIONS: CPT

## 2023-07-24 NOTE — PROGRESS NOTES
"[facility]   Physical Therapy Treatment Note      Name: Lm Gautam  Clinic Number: [id number]    Therapy Diagnosis: s/p right shoulder Rotator cuff repair  Encounter Diagnosis   Name Primary?    Acute pain of right shoulder Yes       Physician: Kirill Zuniga MD    Visit Date: 7/24/2023  Visit#: 11/15  PTA Visit #: 2/5     Time In: 802  Time Out: 842  Total Billable Time: 27 minutes    Subjective     Pt reports: "No problems today."     Response to previous treatment: Good  Functional change: none    Pain: 0/10  Location: right shoulder      Objective      Objective Measures updated at progress report unless specified.     7/14/23  Range of motion Right Shoulder  Motion AROM PROM   Shoulder flexion 135 145   Shoulder extension 50 68   Shoulder abduction 100 104   Shoulder internal rotation 52 65   Shoulder external rotation 52 45       Treatment     Frederic received the treatments listed below:    Focus treatment on right shoulder PROM/AAROM NO ACTIVE BICEP FOR 3 WEEKS 6/30/23 TO 7/26/24  Therapeutic exercises to develop strength and ROM for 32 minutes including:  Right shoulder:  Codmans/pendulums: 3 minutes  Pulleys with elbow bent 5 minutes  UE bike x 5 minutes  Wall wheel flexion x 20  Finger ladder flexion and abduction 2 x 10 each  Seated abduction elbow bent with dowel 2 x 10 5 s/h   Seated scapular squeezes 2 x 20  Seated ER/IR 2 x 20  Supine ER with dowel 2 x 10 5 s/h   Supine overhead flexion elbow bent with dowel 2 x 10 5 s/h       NOT TODAY-Manual therapy techniques: Joint mobilizations and Soft tissue Mobilization were applied to the: right shoulder for 8 minutes, including: Mild joint mobilization, passive manual stretching flexion and ER.      Patient Education and Home Exercises       Education provided:   -    received verbal and tactile cues to facilitate proper execution of exercises and body mechanics.     Written Home Exercises Provided: yes. " Completed    Assessment     Frederic performed all exercises well today without increased pain.  Frederic Is progressing well towards his goals.   Pt prognosis is Good.     Pt will continue to benefit from skilled outpatient physical therapy to address the deficits listed in the problem list box on initial evaluation, provide pt/family education and to maximize pt's level of independence in the home and community environment.     Pt's spiritual, cultural and educational needs considered and pt agreeable to plan of care and goals.     Anticipated barriers to physical therapy: none    Goals: Goals: patient in agreement with the following goals:  Short Term Goals: 3 weeks   1. Patient will be independent with home exercise program in 1 week. GOAL MET.  2. Patient will report decreased right shoulder pain to 3/10 to improve quality of life. GOAL MET.  3. Patient will increase right shoulder AROM grossly by 30 degrees to improve functional mobility. GOAL MET.    Long Term Goals: 5 weeks   1. Patient will increase right shoulder strength to 3-/5 to improve functional mobility.  2. Patient will increase right shoulder flexion and abduction to 110 degrees.  3. Patient will increase right shoulder ER to 45 degrees and IR to 60 degrees to improve functional mobility  4. Patient will increase FOTO Discharge score to 71 to improve functional outcomes.    Plan     Continue with present plan of care.    Rickey Vicente, PT

## 2023-07-26 ENCOUNTER — CLINICAL SUPPORT (OUTPATIENT)
Dept: REHABILITATION | Facility: HOSPITAL | Age: 73
End: 2023-07-26
Payer: MEDICARE

## 2023-07-26 DIAGNOSIS — M25.511 ACUTE PAIN OF RIGHT SHOULDER: Primary | ICD-10-CM

## 2023-07-26 PROCEDURE — 97110 THERAPEUTIC EXERCISES: CPT | Mod: CQ

## 2023-07-26 NOTE — PROGRESS NOTES
[facility]   Physical Therapy Treatment Note      Name: Lm Gautam  Clinic Number: [id number]    Therapy Diagnosis: s/p right shoulder Rotator cuff repair  Encounter Diagnosis   Name Primary?    Acute pain of right shoulder Yes       Physician: Kirill Zuniga MD    Visit Date: 7/26/2023  Visit#: 13/15  PTA Visit #: 1/5     Time In: 804  Time Out: 844  Total Billable Time: 40 minutes    Subjective     Pt reports: Pt still reports he cannot wash his hair on his left side like he wants to but can get under his arm fine.     Response to previous treatment: Good  Functional change: none    Pain: 0/10  Location: right shoulder      Objective      Objective Measures updated at progress report unless specified.     7/14/23  Range of motion Right Shoulder  Motion AROM PROM   Shoulder flexion 135 145   Shoulder extension 50 68   Shoulder abduction 100 104   Shoulder internal rotation 52 65   Shoulder external rotation 52 45       Treatment     Frederic received the treatments listed below:    Focus treatment on right shoulder PROM/AAROM NO ACTIVE BICEP FOR 3 WEEKS 6/30/23 TO 7/26/24  Therapeutic exercises to develop strength and ROM for  minutes including:  Right shoulder:  Codmans/pendulums: 3 minutes  Pulleys with elbow bent 5 minutes  UE bike x 5 minutes  Wall wheel flexion x 20  Finger ladder flexion and abduction 2 x 10 each  Seated abduction elbow bent with dowel 2 x 10 5 s/h   Seated scapular squeezes 2 x 20  Seated ER/IR 2 x 20  Supine ER with dowel 2 x 10 5 s/h   Supine overhead flexion elbow bent with dowel 2 x 10 5 s/h       NOT TODAY-Manual therapy techniques: Joint mobilizations and Soft tissue Mobilization were applied to the: right shoulder for 8 minutes, including: Mild joint mobilization, passive manual stretching flexion and ER.      Patient Education and Home Exercises       Education provided:   -    received verbal and tactile cues to facilitate proper execution of exercises  and body mechanics.     Written Home Exercises Provided: yes. Completed    Assessment     Frederic performed his right shoulder rehab well today without difficulty.     Frederic Is progressing well towards his goals.   Pt prognosis is Good.     Pt will continue to benefit from skilled outpatient physical therapy to address the deficits listed in the problem list box on initial evaluation, provide pt/family education and to maximize pt's level of independence in the home and community environment.     Pt's spiritual, cultural and educational needs considered and pt agreeable to plan of care and goals.     Anticipated barriers to physical therapy: none    Goals: Goals: patient in agreement with the following goals:  Short Term Goals: 3 weeks   1. Patient will be independent with home exercise program in 1 week. GOAL MET.  2. Patient will report decreased right shoulder pain to 3/10 to improve quality of life. GOAL MET.  3. Patient will increase right shoulder AROM grossly by 30 degrees to improve functional mobility. GOAL MET.    Long Term Goals: 5 weeks   1. Patient will increase right shoulder strength to 3-/5 to improve functional mobility.  2. Patient will increase right shoulder flexion and abduction to 110 degrees.  3. Patient will increase right shoulder ER to 45 degrees and IR to 60 degrees to improve functional mobility  4. Patient will increase FOTO Discharge score to 71 to improve functional outcomes.    Plan     Continue with present plan of care.    Diana Kebede, PTA

## 2023-07-31 ENCOUNTER — CLINICAL SUPPORT (OUTPATIENT)
Dept: REHABILITATION | Facility: HOSPITAL | Age: 73
End: 2023-07-31
Payer: MEDICARE

## 2023-07-31 DIAGNOSIS — M25.511 ACUTE PAIN OF RIGHT SHOULDER: Primary | ICD-10-CM

## 2023-07-31 PROCEDURE — 97110 THERAPEUTIC EXERCISES: CPT | Mod: CQ

## 2023-07-31 NOTE — PROGRESS NOTES
"[facility]   Physical Therapy Treatment Note      Name: Lm Gautam  Clinic Number: [id number]    Therapy Diagnosis: s/p right shoulder Rotator cuff repair  Encounter Diagnosis   Name Primary?    Acute pain of right shoulder Yes       Physician: Kirill Zuniga MD    Visit Date: 7/31/2023  Visit#: 14/15  PTA Visit #: 2/5     Time In: 806  Time Out: 846  Total Billable Time: 40 minutes    Subjective     Pt reports: "That doctor told me I was healed and I could go out and do whatever I wanted to with my shoulder. He said everything looked good. Just to be cautious of it. I go back in 4 weeks."    Response to previous treatment: Good  Functional change: none    Pain: 0/10  Location: right shoulder      Objective      Objective Measures updated at progress report unless specified.     7/14/23  Range of motion Right Shoulder  Motion AROM PROM   Shoulder flexion 135 145   Shoulder extension 50 68   Shoulder abduction 100 104   Shoulder internal rotation 52 65   Shoulder external rotation 52 45       Treatment     Frederic received the treatments listed below:    Therapeutic exercises to develop strength and ROM for 40 minutes including:  Right shoulder:  Pulleys flexion x 3 minutes  UE bike x 5 minutes  Wall wheel flexion x 20  Shoulder rows and extension red tband 2 x 10  Shoulder Internal rotation/External rotation red tband 2 x 10  Finger ladder flexion and abduction 2 x 10 each  Behind back stretch 10 x 10 s/h with tbar   Side lying shoulder External rotation 2 x 10   Supine ER with dowel 2 x 10 5 s/h   Supine overhead flexion with dowel 2 x 10 5 s/h       NOT TODAY-Manual therapy techniques: Joint mobilizations and Soft tissue Mobilization were applied to the: right shoulder for 8 minutes, including: Mild joint mobilization, passive manual stretching flexion and ER.      Patient Education and Home Exercises       Education provided:   -  received verbal and tactile cues to facilitate " proper execution of exercises and body mechanics.     Written Home Exercises Provided: yes. New/updated HEP given and Completed on today along with red tband. Pt demo understanding of new HEP.    Assessment     Frederic able to perform new light resistance exercises for right shoulder without difficulty or increased pain voiced.     Frederic Is progressing well towards his goals.   Pt prognosis is Good.     Pt will continue to benefit from skilled outpatient physical therapy to address the deficits listed in the problem list box on initial evaluation, provide pt/family education and to maximize pt's level of independence in the home and community environment.     Pt's spiritual, cultural and educational needs considered and pt agreeable to plan of care and goals.     Anticipated barriers to physical therapy: none    Goals: Goals: patient in agreement with the following goals:  Short Term Goals: 3 weeks   1. Patient will be independent with home exercise program in 1 week. GOAL MET.  2. Patient will report decreased right shoulder pain to 3/10 to improve quality of life. GOAL MET.  3. Patient will increase right shoulder AROM grossly by 30 degrees to improve functional mobility. GOAL MET.    Long Term Goals: 5 weeks   1. Patient will increase right shoulder strength to 3-/5 to improve functional mobility.  2. Patient will increase right shoulder flexion and abduction to 110 degrees.  3. Patient will increase right shoulder ER to 45 degrees and IR to 60 degrees to improve functional mobility  4. Patient will increase FOTO Discharge score to 71 to improve functional outcomes.    Plan     Continue with present plan of care.    Diana Kebede, MILAD

## 2023-08-02 ENCOUNTER — CLINICAL SUPPORT (OUTPATIENT)
Dept: REHABILITATION | Facility: HOSPITAL | Age: 73
End: 2023-08-02
Payer: MEDICARE

## 2023-08-02 DIAGNOSIS — M25.511 ACUTE PAIN OF RIGHT SHOULDER: Primary | ICD-10-CM

## 2023-08-02 PROCEDURE — 97110 THERAPEUTIC EXERCISES: CPT

## 2023-08-02 NOTE — PLAN OF CARE
[facility]  Physical Therapy Plan of Care Note     Name: Lm Gautam  Clinic Number: [id number]    s/p right shoulder orthopedic rotator cuff surgery on 6/12/23   Therapy Diagnosis:   Encounter Diagnosis   Name Primary?    Acute pain of right shoulder Yes     Physician: Kirill Zuniga MD    Visit Date: 8/2/2023    Physician Orders: Frequency 3 x / Duration 4  weeks  Medical Diagnosis from Referral: M25.511 (ICD-10-CM) - Right shoulder pain  M25.512 (ICD-10-CM) - Left shoulder pain  Evaluation Date: 6/28/23  Authorization Period Expiration: 9/1/23   Plan of Care Expiration: 9/1/23     Visit # / Visits authorized: 15/ 27  FOTO: 44/100    Precautions: Standard  Functional Level Prior to Evaluation:  independent    Subjective     Update: Frederic is tolerating therapy well and participating 3 x week for ROM and strengthening of right shoulder.    Objective      Update: 8/2/23  Range of motion Right Shoulder  Motion AROM PROM   Shoulder flexion 145 151   Shoulder extension 50 68   Shoulder abduction 120 140   Shoulder internal rotation 60 70   Shoulder external rotation 70 78       Assessment     Update: Progressing well with objective strength and ROM. He does complain of right arm soreness and tightness of shoulder joint.    Previous Short Term Goals Status:   Met.  New Short Term Goals Status:   none  Long Term Goal Status: continue per initial plan of care.  Reasons for Recertification of Therapy:   continued therapy per doctor order.    GOALS  Goals: Goals: patient in agreement with the following goals:  Short Term Goals: 3 weeks   1. Patient will be independent with home exercise program in 1 week. GOAL MET.  2. Patient will report decreased right shoulder pain to 3/10 to improve quality of life. GOAL MET.  3. Patient will increase right shoulder AROM grossly by 30 degrees to improve functional mobility. GOAL MET.    Long Term Goals: 5 weeks   1. Patient will increase right shoulder  strength to 3-/5 to improve functional mobility.  2. Patient will increase right shoulder flexion and abduction to 110 degrees.  3. Patient will increase right shoulder ER to 45 degrees and IR to 60 degrees to improve functional mobility  4. Patient will increase FOTO Discharge score to 71 to improve functional outcomes.    Plan     Updated Certification Period: 8/2/23 to 9/1/23   Recommended Treatment Plan: 3 times per week for 4 weeks:  Electrical Stimulation IFC, Manual Therapy, Moist Heat/ Ice, Therapeutic Exercise, and Ultrasound  Other Recommendations: none    Rickey Vicente PT       Physician Signature:________________________________________________      Date:____________________________________________________________

## 2023-08-02 NOTE — PROGRESS NOTES
"[facility]   Physical Therapy Treatment Note      Name: Lm Gautam  Clinic Number: [id number]    Therapy Diagnosis: s/p right shoulder Rotator cuff repair  Encounter Diagnosis   Name Primary?    Acute pain of right shoulder Yes       Physician: Kirill Zuniga MD    Visit Date: 8/2/2023  Visit#: 15/27  PTA Visit #: 2/5     Time In: 805  Time Out: 843  Total Billable Time: 38 minutes    Subjective     Pt reports: "I still have some pain and tightness in right upper arm but I am not hurting right now. "    Response to previous treatment: Good  Functional change: none    Pain: 0/10  Location: right shoulder      Objective      Objective Measures updated at progress report unless specified.     8/2/23  Range of motion Right Shoulder  Motion AROM PROM   Shoulder flexion 145 151   Shoulder extension 50 68   Shoulder abduction 120 140   Shoulder internal rotation 60 70   Shoulder external rotation 70 78       Treatment     Frederic received the treatments listed below:    Therapeutic exercises to develop strength and ROM for 40 minutes including:  Right shoulder:  Pulleys flexion x 3 minutes  UE bike x 5 minutes  Wall wheel flexion x 20  Shoulder rows and extension green tband 2 x 10 each  Shoulder Internal rotation/External rotation green tband 2 x 10  I, Y, T  2# 2 x 10  Prone right shoulder flexion, abduction, extension 1# 2 x 10  Supine overhead flexion with bar 2 x 10 5 s/h   Behind back stretch 10 x 10 s/h with tbar         NOT TODAY-Manual therapy techniques: Joint mobilizations and Soft tissue Mobilization were applied to the: right shoulder for () minutes, including: Mild joint mobilization, passive manual stretching flexion and ER.      Patient Education and Home Exercises       Education provided:   -  received verbal and tactile cues to facilitate proper execution of exercises and body mechanics.     Written Home Exercises Provided: yes. New/updated HEP given and Completed on today " along with red tband. Pt demo understanding of new HEP.    Assessment     Frederic was able to demonstrate improved ROM of right shoulder per objective measurements. He arrived today with a new prescription for therapy to continue 2-3 x per week for 4 weeks.     Frederic Is progressing well towards his goals.   Pt prognosis is Good.     Pt will continue to benefit from skilled outpatient physical therapy to address the deficits listed in the problem list box on initial evaluation, provide pt/family education and to maximize pt's level of independence in the home and community environment.     Pt's spiritual, cultural and educational needs considered and pt agreeable to plan of care and goals.     Anticipated barriers to physical therapy: none    Goals: Goals: patient in agreement with the following goals:  Short Term Goals: 3 weeks   1. Patient will be independent with home exercise program in 1 week. GOAL MET.  2. Patient will report decreased right shoulder pain to 3/10 to improve quality of life. GOAL MET.  3. Patient will increase right shoulder AROM grossly by 30 degrees to improve functional mobility. GOAL MET.    Long Term Goals: 5 weeks   1. Patient will increase right shoulder strength to 3-/5 to improve functional mobility.  2. Patient will increase right shoulder flexion and abduction to 110 degrees.  3. Patient will increase right shoulder ER to 45 degrees and IR to 60 degrees to improve functional mobility  4. Patient will increase FOTO Discharge score to 71 to improve functional outcomes.    Plan     Continue with present plan of care.    Rickey Vicente, PT

## 2023-08-04 ENCOUNTER — CLINICAL SUPPORT (OUTPATIENT)
Dept: REHABILITATION | Facility: HOSPITAL | Age: 73
End: 2023-08-04
Payer: MEDICARE

## 2023-08-04 DIAGNOSIS — M25.511 ACUTE PAIN OF RIGHT SHOULDER: Primary | ICD-10-CM

## 2023-08-04 PROCEDURE — 97110 THERAPEUTIC EXERCISES: CPT

## 2023-08-04 NOTE — PROGRESS NOTES
"[facility]   Physical Therapy Treatment Note      Name: Lm Gautam  Clinic Number: [id number]    Therapy Diagnosis: s/p right shoulder Rotator cuff repair  Encounter Diagnosis   Name Primary?    Acute pain of right shoulder Yes       Physician: Kirill Zuniga MD    Visit Date: 8/4/2023  Visit#: 16/27  PTA Visit #: 2/5     Time In: 802  Time Out: 840  Total Billable Time:  minutes    Subjective     Pt reports: "No new complaints. "    Response to previous treatment: Good  Functional change: none    Pain: 0/10  Location: right shoulder      Objective      Objective Measures updated at progress report unless specified.     8/2/23  Range of motion Right Shoulder  Motion AROM PROM   Shoulder flexion 145 151   Shoulder extension 50 68   Shoulder abduction 120 140   Shoulder internal rotation 60 70   Shoulder external rotation 70 78       Treatment     Frederic received the treatments listed below:    Therapeutic exercises to develop strength and ROM for 38 minutes including:  Right shoulder:  Pulleys flexion x 3 minutes  UE bike x 5 minutes  Wall wheel flexion x 20  Shoulder rows and extension green tband 3 x 10 each  Shoulder Internal rotation/External rotation green tband 2 x 10  I, Y, T  2# 2 x 10  Prone right shoulder flexion, abduction, extension 1# 2 x 10  Supine overhead flexion with bar 2 x 10 5 s/h   Behind back stretch 10 x 10 s/h with tbar         NOT TODAY-Manual therapy techniques: Joint mobilizations and Soft tissue Mobilization were applied to the: right shoulder for () minutes, including: Mild joint mobilization, passive manual stretching flexion and ER.      Patient Education and Home Exercises       Education provided:   -  received verbal and tactile cues to facilitate proper execution of exercises and body mechanics.     Written Home Exercises Provided: Patient instructed to cont prior HEP.     Assessment     Frederic tolerated therapy well today and had no complaints of pain. " He is progressing with strength and ROM protocol.    Frederic Is progressing well towards his goals.   Pt prognosis is Good.     Pt will continue to benefit from skilled outpatient physical therapy to address the deficits listed in the problem list box on initial evaluation, provide pt/family education and to maximize pt's level of independence in the home and community environment.     Pt's spiritual, cultural and educational needs considered and pt agreeable to plan of care and goals.     Anticipated barriers to physical therapy: none    Goals: Goals: patient in agreement with the following goals:  Short Term Goals: 3 weeks   1. Patient will be independent with home exercise program in 1 week. GOAL MET.  2. Patient will report decreased right shoulder pain to 3/10 to improve quality of life. GOAL MET.  3. Patient will increase right shoulder AROM grossly by 30 degrees to improve functional mobility. GOAL MET.    Long Term Goals: 5 weeks   1. Patient will increase right shoulder strength to 3-/5 to improve functional mobility.  2. Patient will increase right shoulder flexion and abduction to 110 degrees. GOAL MET.  3. Patient will increase right shoulder ER to 45 degrees and IR to 60 degrees to improve functional mobility  4. Patient will increase FOTO Discharge score to 71 to improve functional outcomes.    Plan     Continue with present plan of care.    Rickey Vicente, PT

## 2023-08-07 ENCOUNTER — CLINICAL SUPPORT (OUTPATIENT)
Dept: REHABILITATION | Facility: HOSPITAL | Age: 73
End: 2023-08-07
Payer: MEDICARE

## 2023-08-07 DIAGNOSIS — M25.511 ACUTE PAIN OF RIGHT SHOULDER: Primary | ICD-10-CM

## 2023-08-07 PROCEDURE — 97110 THERAPEUTIC EXERCISES: CPT

## 2023-08-07 NOTE — PROGRESS NOTES
"[facility]   Physical Therapy Treatment Note      Name: Lm Gautam  Clinic Number: [id number]    Therapy Diagnosis: s/p right shoulder Rotator cuff repair  Encounter Diagnosis   Name Primary?    Acute pain of right shoulder Yes       Physician: Kirill Zuniga MD    Visit Date: 8/7/2023  Visit#: 17/27  PTA Visit #: 2/5     Time In: 801  Time Out: 841  Total Billable Time:  40 minutes    Subjective     Pt reports: "sometimes it is hard to sleep at night due to discomfort but other than that I am having no pain. "    Response to previous treatment: Good  Functional change: none    Pain: 0/10  Location: right shoulder      Objective      Objective Measures updated at progress report unless specified.     8/2/23  Range of motion Right Shoulder  Motion AROM PROM   Shoulder flexion 145 151   Shoulder extension 50 68   Shoulder abduction 120 140   Shoulder internal rotation 60 70   Shoulder external rotation 70 78     8/7/23  Shoulder Strength Right   Flexion 3+   Abduction 3-/5   ER 3-/5   IR 3-/5      8/7/23  Functional reach ER C7  Functional reach IR T12    Treatment     Frederic received the treatments listed below:    Therapeutic exercises to develop strength and ROM for 40 minutes including:  Right shoulder:  Pulleys flexion x 3 minutes  UE bike x 5 minutes  Wall wheel shoulder flexion x 20  Shoulder rows and extension green tband 3 x 10 each  Shoulder Internal rotation/External rotation green tband 2 x 20  I, Y, T  2# 2 x 10  Prone right shoulder flexion, abduction, extension 2# 2 x 10  Supine overhead flexion with bar 2 x 10 5 s/h   Behind back stretch 10 x 10 s/h with therabar       NOT TODAY-Manual therapy techniques: Joint mobilizations and Soft tissue Mobilization were applied to the: right shoulder for () minutes, including: Mild joint mobilization, passive manual stretching flexion and ER.      Patient Education and Home Exercises       Education provided:   -  received verbal " and tactile cues to facilitate proper execution of exercises and body mechanics.     Written Home Exercises Provided: Patient instructed to cont prior HEP.     Assessment     Frederic tolerated therapy well today and had no complaints of pain. He is progressing with LTG's and demonstrating improved functional strength per objective measurements.    Frederic Is progressing well towards his goals.   Pt prognosis is Good.     Pt will continue to benefit from skilled outpatient physical therapy to address the deficits listed in the problem list box on initial evaluation, provide pt/family education and to maximize pt's level of independence in the home and community environment.     Pt's spiritual, cultural and educational needs considered and pt agreeable to plan of care and goals.     Anticipated barriers to physical therapy: none    Goals: Goals: patient in agreement with the following goals:  Short Term Goals: 3 weeks   1. Patient will be independent with home exercise program in 1 week. GOAL MET.  2. Patient will report decreased right shoulder pain to 3/10 to improve quality of life. GOAL MET.  3. Patient will increase right shoulder AROM grossly by 30 degrees to improve functional mobility. GOAL MET.    Long Term Goals: 5 weeks   1. Patient will increase right shoulder strength to 3-/5 to improve functional mobility. GOAL MET.  2. Patient will increase right shoulder flexion and abduction to 110 degrees. GOAL MET.  3. Patient will increase right shoulder ER to 45 degrees and IR to 60 degrees to improve functional mobility. GOAL MET.  4. Patient will increase FOTO Discharge score to 71 to improve functional outcomes.    Plan     Continue with present plan of care.    Rickey Vicente, PT

## 2023-08-09 ENCOUNTER — CLINICAL SUPPORT (OUTPATIENT)
Dept: REHABILITATION | Facility: HOSPITAL | Age: 73
End: 2023-08-09
Payer: MEDICARE

## 2023-08-09 DIAGNOSIS — M25.511 ACUTE PAIN OF RIGHT SHOULDER: Primary | ICD-10-CM

## 2023-08-09 PROCEDURE — 97110 THERAPEUTIC EXERCISES: CPT | Mod: CQ

## 2023-08-09 NOTE — PROGRESS NOTES
"[facility]   Physical Therapy Treatment Note      Name: Lm Gautam  Clinic Number: [id number]    Therapy Diagnosis: s/p right shoulder Rotator cuff repair  Encounter Diagnosis   Name Primary?    Acute pain of right shoulder Yes       Physician: Kirill Zuniga MD    Visit Date: 8/9/2023  Visit#: 18/27  PTA Visit #: 1/5     Time In: 0807  Time Out: 0837  Total Billable Time: 30 minutes    Subjective     Pt reports: "It doesn't hurt, but its aggravating" Patient says pain is radiating down his right arm and has moved into his collarbone/clavicle area.  Response to previous treatment: Good  Functional change: none    Pain: 0/10  Location: right shoulder      Objective      Objective Measures updated at progress report unless specified.     8/2/23  Range of motion Right Shoulder  Motion AROM PROM   Shoulder flexion 145 151   Shoulder extension 50 68   Shoulder abduction 120 140   Shoulder internal rotation 60 70   Shoulder external rotation 70 78     8/7/23  Shoulder Strength Right   Flexion 3+   Abduction 3-/5   ER 3-/5   IR 3-/5      8/7/23  Functional reach ER C7  Functional reach IR T12    Treatment     Frederic received the treatments listed below:    Therapeutic exercises to develop strength and ROM for 30 minutes including:  Right shoulder:  UE bike x 5 minutes  Pulleys flexion x 3 minutes  Wall wheel shoulder flexion x 20  Shoulder rows and extension green tband 3 x 10 each  Shoulder Internal rotation/External rotation green tband 2 x 20  I, Y, T  2# 2 x 10  Prone right shoulder flexion, abduction, extension 2# 2 x 10  Supine overhead flexion with orange bar 2 x 10 5 s/h   Behind back stretch 15 x 10 s/h with therabar       NOT TODAY-Manual therapy techniques: Joint mobilizations and Soft tissue Mobilization were applied to the: right shoulder for () minutes, including: Mild joint mobilization, passive manual stretching flexion and ER.      Patient Education and Home Exercises   "     Education provided:   -  received verbal and tactile cues to facilitate proper execution of exercises and body mechanics.     Written Home Exercises Provided: Patient instructed to cont prior HEP.     Assessment     Frederic tolerated therapy well today and had no complaints of pain. He is progressing with LTG's and demonstrating improved functional strength per objective measurements. Patient has decreased the amount of times he comes to therapy from 3 days/week to 2 days/week. He does have limited ROM with shoulder flexion as well as external rotation. Scapular flexion with 2# weights seem to be his most painful and limiting exercise at this time often compensating with upper trap elevation instead of scapular upward rotation.     Frederic Is progressing well towards his goals.   Pt prognosis is Good.     Pt will continue to benefit from skilled outpatient physical therapy to address the deficits listed in the problem list box on initial evaluation, provide pt/family education and to maximize pt's level of independence in the home and community environment.     Pt's spiritual, cultural and educational needs considered and pt agreeable to plan of care and goals.     Anticipated barriers to physical therapy: none    Goals: Goals: patient in agreement with the following goals:  Short Term Goals: 3 weeks   1. Patient will be independent with home exercise program in 1 week. GOAL MET.  2. Patient will report decreased right shoulder pain to 3/10 to improve quality of life. GOAL MET.  3. Patient will increase right shoulder AROM grossly by 30 degrees to improve functional mobility. GOAL MET.    Long Term Goals: 5 weeks   1. Patient will increase right shoulder strength to 3-/5 to improve functional mobility. GOAL MET.  2. Patient will increase right shoulder flexion and abduction to 110 degrees. GOAL MET.  3. Patient will increase right shoulder ER to 45 degrees and IR to 60 degrees to improve functional mobility. GOAL  MET.  4. Patient will increase FOTO Discharge score to 71 to improve functional outcomes.    Plan     Continue with present plan of care.    Agustin Rees, PTA

## 2023-08-14 ENCOUNTER — CLINICAL SUPPORT (OUTPATIENT)
Dept: REHABILITATION | Facility: HOSPITAL | Age: 73
End: 2023-08-14
Payer: MEDICARE

## 2023-08-14 DIAGNOSIS — M25.511 ACUTE PAIN OF RIGHT SHOULDER: Primary | ICD-10-CM

## 2023-08-14 PROCEDURE — 97110 THERAPEUTIC EXERCISES: CPT | Mod: CQ

## 2023-08-14 PROCEDURE — 97035 APP MDLTY 1+ULTRASOUND EA 15: CPT | Mod: CQ

## 2023-08-14 NOTE — PROGRESS NOTES
[facility]   Physical Therapy Treatment Note      Name: Lm Gautam  Clinic Number: [id number]    Therapy Diagnosis: s/p right shoulder Rotator cuff repair  Encounter Diagnosis   Name Primary?    Acute pain of right shoulder Yes       Physician: Kirill Zuniga MD    Visit Date: 8/14/2023  Visit#: 19/27  PTA Visit #: 2/5     Time In: 0807  Time Out: 0852  Total Billable Time: 45 minutes    Subjective     Pt reports: My shoulder started swelling on the way to [place]. It is sore below my shoulder, I don't know.  Response to previous treatment: Good  Functional change: none    Pain: 0/10-- soreness   Location: right shoulder      Objective      8/2/23  Range of motion Right Shoulder  Motion AROM PROM   Shoulder flexion 145 151   Shoulder extension 50 68   Shoulder abduction 120 140   Shoulder internal rotation 60 70   Shoulder external rotation 70 78     8/7/23  Shoulder Strength Right   Flexion 3+   Abduction 3-/5   ER 3-/5   IR 3-/5      8/7/23  Functional reach ER C7  Functional reach IR T12    Treatment     Frederic received the treatments listed below:    Therapeutic exercises to develop strength and ROM for 37 minutes including:  Right shoulder:  UE bike x 5 minutes  Pulleys flexion x 3 minutes  Wall wheel shoulder flexion 1 x 20 5 s/h   Shoulder rows and extension green tband 3 x 10 each  Shoulder Internal rotation/External rotation green tband 2 x 20  I, Y, T  2# 1 x 10  Prone right shoulder flexion, abduction, extension 2# 2 x 10  Supine overhead flexion with orange bar 2 x 10 5 s/h   Supine Serratus punch 2 x 10   Behind back stretch 10 x 10 s/h with therabar     Lm received ultrasound to manage pain and inflammation at 100 % duty cycle applied to the Right  at frequency of 1MHz, an intensity of 1.5  W/cm2 for a duration of 8 minutes. Patient tolerated treatment well without adverse effects. Therapist was in attendance throughout intervention.        NOT TODAY-Manual therapy  techniques: Joint mobilizations and Soft tissue Mobilization were applied to the: right shoulder for () minutes, including: Mild joint mobilization, passive manual stretching flexion and ER.      Patient Education and Home Exercises       Education provided:   -  received verbal and tactile cues to facilitate proper execution of exercises and body mechanics.     Written Home Exercises Provided: Patient instructed to cont prior HEP.     Assessment     Frederic tolerated today's session fairly well without difficulty or increased pain. Pt reports the US felt good to his arm.     Frederic Is progressing well towards his goals.   Pt prognosis is Good.     Pt will continue to benefit from skilled outpatient physical therapy to address the deficits listed in the problem list box on initial evaluation, provide pt/family education and to maximize pt's level of independence in the home and community environment.     Pt's spiritual, cultural and educational needs considered and pt agreeable to plan of care and goals.     Anticipated barriers to physical therapy: none    Goals: Goals: patient in agreement with the following goals:  Short Term Goals: 3 weeks   1. Patient will be independent with home exercise program in 1 week. GOAL MET.  2. Patient will report decreased right shoulder pain to 3/10 to improve quality of life. GOAL MET.  3. Patient will increase right shoulder AROM grossly by 30 degrees to improve functional mobility. GOAL MET.    Long Term Goals: 5 weeks   1. Patient will increase right shoulder strength to 3-/5 to improve functional mobility. GOAL MET.  2. Patient will increase right shoulder flexion and abduction to 110 degrees. GOAL MET.  3. Patient will increase right shoulder ER to 45 degrees and IR to 60 degrees to improve functional mobility. GOAL MET.  4. Patient will increase FOTO Discharge score to 71 to improve functional outcomes.    Plan     Continue with present plan of care.    Diana Kebede, MILAD

## 2023-08-16 ENCOUNTER — CLINICAL SUPPORT (OUTPATIENT)
Dept: REHABILITATION | Facility: HOSPITAL | Age: 73
End: 2023-08-16
Payer: MEDICARE

## 2023-08-16 DIAGNOSIS — M25.511 ACUTE PAIN OF RIGHT SHOULDER: Primary | ICD-10-CM

## 2023-08-16 PROCEDURE — 97035 APP MDLTY 1+ULTRASOUND EA 15: CPT | Mod: CQ

## 2023-08-16 PROCEDURE — 97110 THERAPEUTIC EXERCISES: CPT | Mod: CQ

## 2023-08-16 NOTE — PROGRESS NOTES
[facility]   Physical Therapy Treatment Note      Name: Lm Gautam  Clinic Number: [id number]    Therapy Diagnosis: s/p right shoulder Rotator cuff repair  Encounter Diagnosis   Name Primary?    Acute pain of right shoulder Yes       Physician: Kirill Zuniga MD    Visit Date: 8/16/2023  Visit#: 20/27  PTA Visit #: 3/5     Time In: 0806  Time Out: 0852  Total Billable Time: 46 minutes    Subjective     Pt reports: You had my shoulder feeling good after that ultrasound then I got home an did a lot of work and it's been sore ever since.   Response to previous treatment: Good  Functional change: none    Pain: 0/10-- soreness   Location: right shoulder      Objective      8/2/23  Range of motion Right Shoulder  Motion AROM PROM   Shoulder flexion 145 151   Shoulder extension 50 68   Shoulder abduction 120 140   Shoulder internal rotation 60 70   Shoulder external rotation 70 78     8/7/23  Shoulder Strength Right   Flexion 3+   Abduction 3-/5   ER 3-/5   IR 3-/5      8/7/23  Functional reach ER C7  Functional reach IR T12    Treatment     Frederic received the treatments listed below:    Therapeutic exercises to develop strength and ROM for 38 minutes including:  Right shoulder:  UE bike x 3 minutes  Wall wheel shoulder flexion 1 x 20 10 s/h   Shoulder rows Blue tband 2 x 20  Shoulder Internal rotation/External rotation green tband 2 x 20  Side lying External rotation 2# 2 x 10   D1 flexion red tband 2 x 10 , D2 flexion 2 x 10   Wall push 2 x 10   Prone right shoulder flexion, abduction, extension 3#, and rows 2#  2 x 10  Supine overhead flexion with orange bar 10 x 10 5 s/h   Supine Serratus punch 2 x 10 1#     I, Y, T  2# 1 x 10-- not today  Behind back stretch 10 x 10 s/h with therabar -- not today     Lm received ultrasound to manage pain and inflammation at 100 % duty cycle applied to the Right  at frequency of 1MHz, an intensity of 1.5  W/cm2 for a duration of 8 minutes. Patient  tolerated treatment well without adverse effects. Therapist was in attendance throughout intervention.    Patient Education and Home Exercises       Education provided:   - received verbal and tactile cues to facilitate proper execution of exercises and body mechanics.     Written Home Exercises Provided: Patient instructed to cont prior HEP.     Assessment     Frederic performed new shoulder strengthening exercises focusing on flexion/overhead motions due to most difficulty per patient without extreme difficulty or increased pain just voicing some muscle fatigue.     Frederic Is progressing well towards his goals.   Pt prognosis is Good.     Pt will continue to benefit from skilled outpatient physical therapy to address the deficits listed in the problem list box on initial evaluation, provide pt/family education and to maximize pt's level of independence in the home and community environment.     Pt's spiritual, cultural and educational needs considered and pt agreeable to plan of care and goals.     Anticipated barriers to physical therapy: none    Goals: Goals: patient in agreement with the following goals:  Short Term Goals: 3 weeks   1. Patient will be independent with home exercise program in 1 week. GOAL MET.  2. Patient will report decreased right shoulder pain to 3/10 to improve quality of life. GOAL MET.  3. Patient will increase right shoulder AROM grossly by 30 degrees to improve functional mobility. GOAL MET.    Long Term Goals: 5 weeks   1. Patient will increase right shoulder strength to 3-/5 to improve functional mobility. GOAL MET.  2. Patient will increase right shoulder flexion and abduction to 110 degrees. GOAL MET.  3. Patient will increase right shoulder ER to 45 degrees and IR to 60 degrees to improve functional mobility. GOAL MET.  4. Patient will increase FOTO Discharge score to 71 to improve functional outcomes.    Plan     Continue with present plan of care.    Diana Kebede, PTA

## 2023-08-21 ENCOUNTER — CLINICAL SUPPORT (OUTPATIENT)
Dept: REHABILITATION | Facility: HOSPITAL | Age: 73
End: 2023-08-21
Payer: MEDICARE

## 2023-08-21 DIAGNOSIS — M25.511 ACUTE PAIN OF RIGHT SHOULDER: Primary | ICD-10-CM

## 2023-08-21 PROCEDURE — 97035 APP MDLTY 1+ULTRASOUND EA 15: CPT

## 2023-08-21 PROCEDURE — 97110 THERAPEUTIC EXERCISES: CPT

## 2023-08-21 NOTE — PROGRESS NOTES
[facility]   Physical Therapy Treatment Note      Name: Lm Gautam  Clinic Number: [id number]    Therapy Diagnosis: s/p right shoulder Rotator cuff repair  Encounter Diagnosis   Name Primary?    Acute pain of right shoulder Yes       Physician: Kirill Zuniga MD    Visit Date: 8/21/2023  Visit#: 21/27  PTA Visit #: 3/5     Time In: 0802  Time Out: 0836  Total Billable Time:  minutes    Subjective     Pt reports: I hurt for a couple of days after the last visit and had some sharp pains but I am OK today just a little sore.   Response to previous treatment: Good  Functional change: none    Pain: 0/10-- soreness   Location: right shoulder      Objective      8/2/23  Range of motion Right Shoulder  Motion AROM PROM   Shoulder flexion 145 151   Shoulder extension 50 68   Shoulder abduction 120 140   Shoulder internal rotation 60 70   Shoulder external rotation 70 78     8/7/23  Shoulder Strength Right   Flexion 3+   Abduction 3-/5   ER 3-/5   IR 3-/5      8/7/23  Functional reach ER C7  Functional reach IR T12    Treatment     Frederic received the treatments listed below:    Therapeutic exercises to develop strength and ROM for 22 minutes including:  Right shoulder:  NOT TODAY-UE bike x 3 minutes  Wall wheel shoulder flexion 1 x 20 10 s/h   Shoulder rows Blue tband 2 x 20  Shoulder Internal rotation/External rotation green tband 2 x 20  Side lying External rotation 3# 2 x 10   D1 flexion red tband 2 x 10 , D2 flexion 2 x 10   Wall push 2 x 10   Prone right shoulder flexion, abduction, extension 3#, and rows 3#  2 x 10  Supine overhead flexion with orange bar 1 x 10 5 s/h   Supine Serratus punch 2 x 10 2#       I, Y, T  2# 1 x 10-- not today      Lm received ultrasound to manage pain and inflammation at 100 % duty cycle applied to the Right  at frequency of 1MHz, an intensity of 1.5 to 1.0  W/cm2 for a duration of 8 minutes. Patient tolerated treatment well without adverse effects.  Therapist was in attendance throughout intervention.    Patient Education and Home Exercises       Education provided:   - received verbal and tactile cues to facilitate proper execution of exercises and body mechanics.     Written Home Exercises Provided: Patient instructed to cont prior HEP.     Assessment     Frederic had no difficulty performing ROM and strengthening exercises today. He continues to benefit from shoulder rehab program.    Frederic Is progressing well towards his goals.   Pt prognosis is Good.     Pt will continue to benefit from skilled outpatient physical therapy to address the deficits listed in the problem list box on initial evaluation, provide pt/family education and to maximize pt's level of independence in the home and community environment.     Pt's spiritual, cultural and educational needs considered and pt agreeable to plan of care and goals.     Anticipated barriers to physical therapy: none    Goals: Goals: patient in agreement with the following goals:  Short Term Goals: 3 weeks   1. Patient will be independent with home exercise program in 1 week. GOAL MET.  2. Patient will report decreased right shoulder pain to 3/10 to improve quality of life. GOAL MET.  3. Patient will increase right shoulder AROM grossly by 30 degrees to improve functional mobility. GOAL MET.    Long Term Goals: 5 weeks   1. Patient will increase right shoulder strength to 3-/5 to improve functional mobility. GOAL MET.  2. Patient will increase right shoulder flexion and abduction to 110 degrees. GOAL MET.  3. Patient will increase right shoulder ER to 45 degrees and IR to 60 degrees to improve functional mobility. GOAL MET.  4. Patient will increase FOTO Discharge score to 71 to improve functional outcomes.    Plan     Continue with present plan of care.    Rickey Vicente, PT

## 2023-08-23 ENCOUNTER — CLINICAL SUPPORT (OUTPATIENT)
Dept: REHABILITATION | Facility: HOSPITAL | Age: 73
End: 2023-08-23
Payer: MEDICARE

## 2023-08-23 DIAGNOSIS — M25.511 ACUTE PAIN OF RIGHT SHOULDER: Primary | ICD-10-CM

## 2023-08-23 PROCEDURE — 97035 APP MDLTY 1+ULTRASOUND EA 15: CPT | Mod: CQ

## 2023-08-23 PROCEDURE — 97110 THERAPEUTIC EXERCISES: CPT | Mod: CQ

## 2023-08-23 NOTE — PROGRESS NOTES
[facility]   Physical Therapy Treatment Note      Name: Lm Gautam  Clinic Number: [id number]    Therapy Diagnosis: s/p right shoulder Rotator cuff repair  Encounter Diagnosis   Name Primary?    Acute pain of right shoulder Yes       Physician: Kirill Zuniga MD    Visit Date: 8/23/2023  Visit#: 22/27  PTA Visit #: 1/5     Time In: 0802  Time Out: 0844  Total Billable Time: 42 minutes    Subjective     Pt reports: I go back to doctor today for my shoulder. I think it's doing good. I've been working out in yard and driving tractor so its getting a good workout outside of therapy. Still wakes me up at night but not as bad as it was.   Response to previous treatment: Good  Functional change: none    Pain: 0/10-- soreness   Location: right shoulder      Objective      8/2/23  Range of motion Right Shoulder  Motion AROM PROM   Shoulder flexion 145 151   Shoulder extension 50 68   Shoulder abduction 120 140   Shoulder internal rotation 60 70   Shoulder external rotation 70 78     8/7/23  Shoulder Strength Right   Flexion 3+   Abduction 3-/5   ER 3-/5   IR 3-/5      8/7/23  Functional reach ER C7  Functional reach IR T12    Treatment     Frederic received the treatments listed below:    Therapeutic exercises to develop strength and ROM for 34 minutes including:  Right shoulder:  UE bike x 3 minutes  Wall wheel shoulder flexion 1 x 20 10 s/h   Shoulder Internal rotation/External rotation blue tband 2 x 20  2#   Side lying External rotation 3# 2 x 10   D1 flexion red tband 2 x 10, D2 flexion 2 x 10   Wall push 2 x 15   Prone right shoulder flexion, abduction, extension 3#, and rows 3#  2 x 10  Standing overhead flexion with orange bar 10 x 10 5 s/h     NOT TODAY:   Supine Serratus punch 2 x 10 2#   I, Y, T  2# 1 x 10      Lm received ultrasound to manage pain and inflammation at 100 % duty cycle applied to the Right  at frequency of 1MHz, an intensity of 1.5 W/cm2 for a duration of 8  minutes. Patient tolerated treatment well without adverse effects. Therapist was in attendance throughout intervention.    Patient Education and Home Exercises       Education provided:   - received verbal and tactile cues to facilitate proper execution of exercises and body mechanics.     Written Home Exercises Provided: Patient instructed to cont prior HEP.     Assessment     Frederic performed hi shoulder rehab well today with improved strength with increased of resistance of tbands. Pt still occasionally requires demo of proper technique of exercises.     Frederic Is progressing well towards his goals.   Pt prognosis is Good.     Pt will continue to benefit from skilled outpatient physical therapy to address the deficits listed in the problem list box on initial evaluation, provide pt/family education and to maximize pt's level of independence in the home and community environment.     Pt's spiritual, cultural and educational needs considered and pt agreeable to plan of care and goals.     Anticipated barriers to physical therapy: none    Goals: Goals: patient in agreement with the following goals:  Short Term Goals: 3 weeks   1. Patient will be independent with home exercise program in 1 week. GOAL MET.  2. Patient will report decreased right shoulder pain to 3/10 to improve quality of life. GOAL MET.  3. Patient will increase right shoulder AROM grossly by 30 degrees to improve functional mobility. GOAL MET.    Long Term Goals: 5 weeks   1. Patient will increase right shoulder strength to 3-/5 to improve functional mobility. GOAL MET.  2. Patient will increase right shoulder flexion and abduction to 110 degrees. GOAL MET.  3. Patient will increase right shoulder ER to 45 degrees and IR to 60 degrees to improve functional mobility. GOAL MET.  4. Patient will increase FOTO Discharge score to 71 to improve functional outcomes.    Plan     Continue with present plan of care.    Diana Kebede, PTA

## 2023-08-25 ENCOUNTER — DOCUMENTATION ONLY (OUTPATIENT)
Dept: REHABILITATION | Facility: HOSPITAL | Age: 73
End: 2023-08-25
Payer: MEDICARE

## 2023-08-25 NOTE — PROGRESS NOTES
[facility]  Physical Therapy Discharge Note    Name: Lm Gautam  Clinic Number: [id number]     Therapy Diagnosis: s/p right shoulder Rotator cuff repair       Encounter Diagnosis   Name Primary?    Acute pain of right shoulder Yes      Physician: Kirill Zuniga MD  Evaluation Date: 6/28/23  Date of Last visit: 8/23/23  Total Visits Received: 22    OBJECTIVE    Range of motion Right Shoulder  Motion AROM PROM   Shoulder flexion 145 151   Shoulder extension 50 68   Shoulder abduction 120 140   Shoulder internal rotation 60 70   Shoulder external rotation 70 78        Shoulder Strength Right   Flexion 3+   Abduction 3-/5   ER 3-/5   IR 3-/5        Functional reach ER C7  Functional reach IR T12      ASSESSMENT      Lm called clinic and stated he has been released from Dr. Kirill Zuniga's care and didn't have to return for further therapy. He has met most all of his therapy goals.    Goals:   Short Term Goals: 3 weeks   1. Patient will be independent with home exercise program in 1 week. GOAL MET.  2. Patient will report decreased right shoulder pain to 3/10 to improve quality of life. GOAL MET.  3. Patient will increase right shoulder AROM grossly by 30 degrees to improve functional mobility. GOAL MET.     Long Term Goals: 5 weeks   1. Patient will increase right shoulder strength to 3-/5 to improve functional mobility. GOAL MET.  2. Patient will increase right shoulder flexion and abduction to 110 degrees. GOAL MET.  3. Patient will increase right shoulder ER to 45 degrees and IR to 60 degrees to improve functional mobility. GOAL MET.  4. Patient will increase FOTO Discharge score to 71 to improve functional outcomes.    PLAN   This patient is discharged from Physical Therapy      Rickey Vicente, PT